# Patient Record
Sex: FEMALE | Race: WHITE | NOT HISPANIC OR LATINO | Employment: STUDENT | URBAN - METROPOLITAN AREA
[De-identification: names, ages, dates, MRNs, and addresses within clinical notes are randomized per-mention and may not be internally consistent; named-entity substitution may affect disease eponyms.]

---
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
## 2022-01-08 ENCOUNTER — HEALTH MAINTENANCE LETTER (OUTPATIENT)
Age: 22
End: 2022-01-08

## 2022-06-01 ENCOUNTER — HOSPITAL ENCOUNTER (OUTPATIENT)
Dept: ULTRASOUND IMAGING | Facility: CLINIC | Age: 22
Discharge: HOME | End: 2022-06-01
Attending: PEDIATRICS | Admitting: PEDIATRICS
Payer: COMMERCIAL

## 2022-06-01 DIAGNOSIS — R10.9 ABDOMINAL PAIN: ICD-10-CM

## 2022-06-01 DIAGNOSIS — R74.8 ELEVATED LIVER ENZYMES: ICD-10-CM

## 2022-06-01 PROCEDURE — 76700 US EXAM ABDOM COMPLETE: CPT

## 2022-11-20 ENCOUNTER — HEALTH MAINTENANCE LETTER (OUTPATIENT)
Age: 22
End: 2022-11-20

## 2023-04-15 ENCOUNTER — HEALTH MAINTENANCE LETTER (OUTPATIENT)
Age: 23
End: 2023-04-15

## 2023-08-25 ENCOUNTER — TRANSFERRED RECORDS (OUTPATIENT)
Dept: HEALTH INFORMATION MANAGEMENT | Facility: CLINIC | Age: 23
End: 2023-08-25

## 2023-10-18 ENCOUNTER — TRANSFERRED RECORDS (OUTPATIENT)
Dept: HEALTH INFORMATION MANAGEMENT | Facility: CLINIC | Age: 23
End: 2023-10-18

## 2023-11-01 ENCOUNTER — TRANSFERRED RECORDS (OUTPATIENT)
Dept: HEALTH INFORMATION MANAGEMENT | Facility: CLINIC | Age: 23
End: 2023-11-01

## 2023-11-01 LAB — EJECTION FRACTION: NORMAL %

## 2023-11-09 ENCOUNTER — TRANSFERRED RECORDS (OUTPATIENT)
Dept: HEALTH INFORMATION MANAGEMENT | Facility: CLINIC | Age: 23
End: 2023-11-09

## 2023-11-28 ENCOUNTER — TRANSFERRED RECORDS (OUTPATIENT)
Dept: HEALTH INFORMATION MANAGEMENT | Facility: CLINIC | Age: 23
End: 2023-11-28

## 2024-01-17 ENCOUNTER — TRANSFERRED RECORDS (OUTPATIENT)
Dept: HEALTH INFORMATION MANAGEMENT | Facility: CLINIC | Age: 24
End: 2024-01-17

## 2024-06-22 ENCOUNTER — HEALTH MAINTENANCE LETTER (OUTPATIENT)
Age: 24
End: 2024-06-22

## 2024-07-11 ENCOUNTER — OFFICE VISIT (OUTPATIENT)
Dept: FAMILY MEDICINE | Facility: CLINIC | Age: 24
End: 2024-07-11
Payer: COMMERCIAL

## 2024-07-11 VITALS
HEART RATE: 82 BPM | HEIGHT: 64 IN | TEMPERATURE: 98.7 F | WEIGHT: 188 LBS | DIASTOLIC BLOOD PRESSURE: 78 MMHG | SYSTOLIC BLOOD PRESSURE: 126 MMHG | BODY MASS INDEX: 32.1 KG/M2 | OXYGEN SATURATION: 98 % | RESPIRATION RATE: 14 BRPM

## 2024-07-11 DIAGNOSIS — F50.9 EATING DISORDER IN REMISSION: ICD-10-CM

## 2024-07-11 DIAGNOSIS — Z01.818 PREOP GENERAL PHYSICAL EXAM: Primary | ICD-10-CM

## 2024-07-11 DIAGNOSIS — N92.1 METRORRHAGIA: ICD-10-CM

## 2024-07-11 DIAGNOSIS — F90.0 ATTENTION DEFICIT HYPERACTIVITY DISORDER (ADHD), PREDOMINANTLY INATTENTIVE TYPE: ICD-10-CM

## 2024-07-11 DIAGNOSIS — Z97.5 IUD (INTRAUTERINE DEVICE) IN PLACE: ICD-10-CM

## 2024-07-11 PROBLEM — Z91.040 LATEX ALLERGY: Status: ACTIVE | Noted: 2024-07-11

## 2024-07-11 PROBLEM — Z91.040 LATEX ALLERGY: Status: RESOLVED | Noted: 2024-07-11 | Resolved: 2024-07-11

## 2024-07-11 PROCEDURE — 99204 OFFICE O/P NEW MOD 45 MIN: CPT | Performed by: FAMILY MEDICINE

## 2024-07-11 RX ORDER — LISDEXAMFETAMINE DIMESYLATE 70 MG/1
70 CAPSULE ORAL EVERY MORNING
COMMUNITY

## 2024-07-11 RX ORDER — METHYLPHENIDATE HYDROCHLORIDE 10 MG/1
10 TABLET ORAL DAILY
Qty: 30 TABLET | Refills: 0 | Status: SHIPPED | OUTPATIENT
Start: 2024-09-09 | End: 2024-10-09

## 2024-07-11 RX ORDER — LISDEXAMFETAMINE DIMESYLATE 70 MG/1
70 CAPSULE ORAL DAILY
Qty: 30 CAPSULE | Refills: 0 | Status: SHIPPED | OUTPATIENT
Start: 2024-08-10 | End: 2024-09-09

## 2024-07-11 RX ORDER — METHYLPHENIDATE HYDROCHLORIDE 10 MG/1
10 TABLET ORAL DAILY
Qty: 30 TABLET | Refills: 0 | Status: SHIPPED | OUTPATIENT
Start: 2024-08-10 | End: 2024-09-09

## 2024-07-11 RX ORDER — PROPRANOLOL HYDROCHLORIDE 40 MG/1
40 TABLET ORAL AT BEDTIME
COMMUNITY
Start: 2024-04-25 | End: 2024-08-13

## 2024-07-11 RX ORDER — METHYLPHENIDATE HYDROCHLORIDE 10 MG/1
10 TABLET ORAL DAILY
Qty: 30 TABLET | Refills: 0 | Status: SHIPPED | OUTPATIENT
Start: 2024-07-11 | End: 2024-08-10

## 2024-07-11 RX ORDER — LISDEXAMFETAMINE DIMESYLATE 70 MG/1
70 CAPSULE ORAL DAILY
Qty: 30 CAPSULE | Refills: 0 | Status: SHIPPED | OUTPATIENT
Start: 2024-07-11 | End: 2024-08-10

## 2024-07-11 RX ORDER — LISDEXAMFETAMINE DIMESYLATE 70 MG/1
70 CAPSULE ORAL DAILY
Qty: 30 CAPSULE | Refills: 0 | Status: SHIPPED | OUTPATIENT
Start: 2024-09-09 | End: 2024-10-09

## 2024-07-11 RX ORDER — HYDROXYZINE PAMOATE 25 MG/1
50 CAPSULE ORAL AT BEDTIME
COMMUNITY
Start: 2024-04-10

## 2024-07-11 NOTE — PATIENT INSTRUCTIONS
Patient Education   Preparing for Your Surgery  Getting started  A nurse will call you to review your health history and instructions. They will give you an arrival time based on your scheduled surgery time. Please be ready to share:  Your doctor's clinic name and phone number  Your medical, surgical, and anesthesia history  A list of allergies and sensitivities  A list of medicines, including herbal treatments and over-the-counter drugs  Whether the patient has a legal guardian (ask how to send us the papers in advance)  Please tell us if you're pregnant--or if there's any chance you might be pregnant. Some surgeries may injure a fetus (unborn baby), so they require a pregnancy test. Surgeries that are safe for a fetus don't always need a test, and you can choose whether to have one.   If you have a child who's having surgery, please ask for a copy of Preparing for Your Child's Surgery.    Preparing for surgery  Within 10 to 30 days of surgery: Have a pre-op exam (sometimes called an H&P, or History and Physical). This can be done at a clinic or pre-operative center.  If you're having a , you may not need this exam. Talk to your care team.  At your pre-op exam, talk to your care team about all medicines you take. If you need to stop any medicines before surgery, ask when to start taking them again.  We do this for your safety. Many medicines can make you bleed too much during surgery. Some change how well surgery (anesthesia) drugs work.  Call your insurance company to let them know you're having surgery. (If you don't have insurance, call [phone].)  Call your clinic if there's any change in your health. This includes signs of a cold or flu (sore throat, runny nose, cough, rash, fever). It also includes a scrape or scratch near the surgery site.  If you have questions on the day of surgery, call your hospital or surgery center.  Eating and drinking guidelines  For your safety: Unless your surgeon  tells you otherwise, follow the guidelines below.  Eat and drink as usual until 8 hours before you arrive for surgery. After that, no food or milk.  Drink clear liquids until 2 hours before you arrive. These are liquids you can see through, like water, Gatorade, and Propel Water. They also include plain black coffee and tea (no cream or milk), candy, and breath mints. You can spit out gum when you arrive.  If you drink alcohol: Stop drinking it the night before surgery.  If your care team tells you to take medicine on the morning of surgery, it's okay to take it with a sip of water.  Preventing infection  Shower or bathe the night before and morning of your surgery. Follow the instructions your clinic gave you. (If no instructions, use regular soap.)  Don't shave or clip hair near your surgery site. We'll remove the hair if needed.  Don't smoke or vape the morning of surgery. You may chew nicotine gum up to 2 hours before surgery. A nicotine patch is okay.  Note: Some surgeries require you to completely quit smoking and nicotine. Check with your surgeon.  Your care team will make every effort to keep you safe from infection. We will:  Clean our hands often with soap and water (or an alcohol-based hand rub).  Clean the skin at your surgery site with a special soap that kills germs.  Give you a special gown to keep you warm. (Cold raises the risk of infection.)  Wear special hair covers, masks, gowns and gloves during surgery.  Give antibiotic medicine, if prescribed. Not all surgeries need antibiotics.  What to bring on the day of surgery  Photo ID and insurance card  Copy of your health care directive, if you have one  Glasses and hearing aids (bring cases)  You can't wear contacts during surgery  Inhaler and eye drops, if you use them (tell us about these when you arrive)  CPAP machine or breathing device, if you use them  A few personal items, if spending the night  If you have . . .  A pacemaker, ICD (cardiac  defibrillator) or other implant: Bring the ID card.  An implanted stimulator: Bring the remote control.  A legal guardian: Bring a copy of the certified (court-stamped) guardianship papers.  Please remove any jewelry, including body piercings. Leave jewelry and other valuables at home.  If you're going home the day of surgery  You must have a responsible adult drive you home. They should stay with you overnight as well.  If you don't have someone to stay with you, and you aren't safe to go home alone, we may keep you overnight. Insurance often won't pay for this.  After surgery  If it's hard to control your pain or you need more pain medicine, please call your surgeon's office.  Questions?   If you have any questions for your care team, list them here: _________________________________________________________________________________________________________________________________________________________________________ ____________________________________ ____________________________________ ____________________________________  For informational purposes only. Not to replace the advice of your health care provider. Copyright   2003, 2019 [facility]. All rights reserved. Clinically reviewed by Rissa Barker MD. [employer] 243100 - REV 12/22.

## 2024-07-11 NOTE — PROGRESS NOTES
{PROVIDER CHARTING PREFERENCE:478030}    Uday Broderick is a 24 year old, presenting for the following health issues:  Consult and Pre-Op Exam        7/11/2024     4:27 PM   Additional Questions   Roomed by Carlos Hernández   Accompanied by self      IV sedation to place and IUD at [facility] in [place]     Dr. Thompson    1. No - Have you ever had a heart attack or stroke?  2. No - Have you ever had surgery on your heart or blood vessels, such as a stent, coronary (heart) bypass, or surgery on an artery in the head, neck, heart, or legs?  3. No - Do you have chest pain when you are physically active?  4. No - Do you have a history of heart failure?  5. No - Do you currently have a cold, bronchitis, or symptoms of other respiratory (head and chest) infections?  6. No - Do you have a cough, shortness of breath, or wheezing?  7. Yes - Do you or anyone in your family have a history of blood clots? family  8. No - Do you or anyone in your family have a serious bleeding problem, such as long-lasting bleeding after surgeries or cuts?  9. No - Have you ever had anemia or been told to take iron pills?  10. No - Have you had any abnormal blood loss such as black, tarry or bloody stools, or abnormal vaginal bleeding?  11. No - Have you ever had a blood transfusion?  12. Yes - Are you willing to have a blood transfusion if it is medically needed before, during, or after your surgery?  13. No - Have you or anyone in your family ever had problems with anesthesia (sedation for surgery)?  14. No - Do you have sleep apnea, excessive snoring, or daytime drowsiness?   15. No - Do you have any artifical heart valves or other implanted medical devices, such as a pacemaker, defibrillator, or continuous glucose monitor?  16. No - Do you have any artifical joints?  17. No - Are you allergic to latex?  18. No - Is there any chance that you may be pregnant?         Review of Systems  Constitutional, neuro, ENT, endocrine, pulmonary,  "cardiac, gastrointestinal, genitourinary, musculoskeletal, integument and psychiatric systems are negative, except as otherwise noted.      Objective    /78 (BP Location: Right arm, Patient Position: Chair, Cuff Size: Adult Regular)   Pulse 82   Temp 98.7  F (37.1  C) (Oral)   Resp 14   Ht 1.632 m (5' 4.25\")   Wt 85.3 kg (188 lb)   LMP 06/18/2024   SpO2 98%   Breastfeeding No   BMI 32.02 kg/m    Body mass index is 32.02 kg/m .  Physical Exam   GENERAL: alert and no distress  EYES: Eyes grossly normal to inspection, PERRL and conjunctivae and sclerae normal  HENT: ear canals and TM's normal, nose and mouth without ulcers or lesions  NECK: no adenopathy, no asymmetry, masses, or scars  RESP: lungs clear to auscultation - no rales, rhonchi or wheezes  CV: regular rate and rhythm, normal S1 S2, no S3 or S4, no murmur, click or rub, no peripheral edema  MS: no gross musculoskeletal defects noted, no edema  NEURO: Normal strength and tone, mentation intact and speech normal  PSYCH: mentation appears normal, affect normal/bright          Signed Electronically by: Katelynn Angeles MD  {Email feedback regarding this note to [email or web address]   :[id number]}  "

## 2024-07-11 NOTE — PROGRESS NOTES
Preoperative Evaluation  [facility]  [address] [facility] [address]  Phone: [phone]  Primary Provider: Haven Lazar MD  Pre-op Performing Provider: Katelynn Angeles MD  Jul 11, 2024       Procedure: IUD replacement  Date: 7/29/2024  Anesthesia: sounds like MAC proposed  Provider: Dr. Thompson       Assessment & Plan     The proposed surgical procedure is considered LOW risk.    Preop general physical exam    Metrorrhagia    IUD (intrauterine device) in place    Attention deficit hyperactivity disorder (ADHD), predominantly inattentive type - stable, refills  - lisdexamfetamine (VYVANSE) 70 MG capsule; Take 1 capsule (70 mg) by mouth daily for 30 days  - lisdexamfetamine (VYVANSE) 70 MG capsule; Take 1 capsule (70 mg) by mouth daily for 30 days  - lisdexamfetamine (VYVANSE) 70 MG capsule; Take 1 capsule (70 mg) by mouth daily for 30 days  - methylphenidate (RITALIN) 10 MG tablet; Take 1 tablet (10 mg) by mouth daily for 30 days  - methylphenidate (RITALIN) 10 MG tablet; Take 1 tablet (10 mg) by mouth daily for 30 days  - methylphenidate (RITALIN) 10 MG tablet; Take 1 tablet (10 mg) by mouth daily for 30 days    Eating disorder in remission       - No identified additional risk factors other than previously addressed    Antiplatelet or Anticoagulation Medication Instructions   - Patient is on no antiplatelet or anticoagulation medications.    Additional Medication Instructions  Meggan will hold all of her prescription medications AM of procedure    Recommendation  Approval given to proceed with proposed procedure, without further diagnostic evaluation.      Subjective:   HPI related to upcoming procedure: IUD expiring, needs replacement    1. No - Have you ever had a heart attack or stroke?  2. No - Have you ever had surgery on your heart or blood vessels, such as a stent, coronary (heart) bypass, or surgery on an artery in the head, neck, heart, or legs?  3. No  - Do you have chest pain when you are physically active?  4. No - Do you have a history of heart failure?  5. No - Do you currently have a cold, bronchitis, or symptoms of other respiratory (head and chest) infections?  6. No - Do you have a cough, shortness of breath, or wheezing?  7. Yes - Do you or anyone in your family have a history of blood clots? Family - second degree relative, no FH of thrombophilia  8. No - Do you or anyone in your family have a serious bleeding problem, such as long-lasting bleeding after surgeries or cuts?  9. No - Have you ever had anemia or been told to take iron pills?  10. No - Have you had any abnormal blood loss such as black, tarry or bloody stools, or abnormal vaginal bleeding?  11. No - Have you ever had a blood transfusion?  12. Yes - Are you willing to have a blood transfusion if it is medically needed before, during, or after your surgery?  13. No - Have you or anyone in your family ever had problems with anesthesia (sedation for surgery)?  14. No - Do you have sleep apnea, excessive snoring, or daytime drowsiness?   15. No - Do you have any artifical heart valves or other implanted medical devices, such as a pacemaker, defibrillator, or continuous glucose monitor?  16. No - Do you have any artifical joints?  17. No - Are you allergic to latex?  18. No - Is there any chance that you may be pregnant?        Preoperative Review of    reviewed - no record of controlled substances prescribed.      Status of Chronic Conditions:  See problem list for active medical problems.  Problems all longstanding and stable, except as noted/documented.  See ROS for pertinent symptoms related to these conditions.    Patient Active Problem List    Diagnosis Date Noted    Attention deficit hyperactivity disorder (ADHD), predominantly inattentive type 07/11/2024     Priority: Medium    Eating disorder in remission 07/11/2024     Priority: Medium      Past Medical History:   Diagnosis Date     "ADHD (attention deficit hyperactivity disorder)      No past surgical history on file.  Current Outpatient Medications   Medication Sig Dispense Refill    hydrOXYzine karl (VISTARIL) 25 MG capsule 50 mg at bedtime      levonorgestrel (MIRENA) 20 MCG/24HR IUD 1 each by Intrauterine route once      lisdexamfetamine (VYVANSE) 70 MG capsule 70 mg every morning      lisdexamfetamine (VYVANSE) 70 MG capsule Take 1 capsule (70 mg) by mouth daily for 30 days 30 capsule 0    [START ON 8/10/2024] lisdexamfetamine (VYVANSE) 70 MG capsule Take 1 capsule (70 mg) by mouth daily for 30 days 30 capsule 0    [START ON 9/9/2024] lisdexamfetamine (VYVANSE) 70 MG capsule Take 1 capsule (70 mg) by mouth daily for 30 days 30 capsule 0    methylphenidate (RITALIN) 10 MG tablet Take 1 tablet (10 mg) by mouth daily for 30 days 30 tablet 0    [START ON 8/10/2024] methylphenidate (RITALIN) 10 MG tablet Take 1 tablet (10 mg) by mouth daily for 30 days 30 tablet 0    [START ON 9/9/2024] methylphenidate (RITALIN) 10 MG tablet Take 1 tablet (10 mg) by mouth daily for 30 days 30 tablet 0    methylphenidate (RITALIN) 10 MG tablet as needed  0    propranolol (INDERAL) 40 MG tablet Take 40 mg by mouth at bedtime         No Known Allergies     Social History     Tobacco Use    Smoking status: Never    Smokeless tobacco: Never   Substance Use Topics    Alcohol use: No     No family history on file.  History   Drug Use No             Review of Systems  Constitutional, neuro, ENT, endocrine, pulmonary, cardiac, gastrointestinal, genitourinary, musculoskeletal, integument and psychiatric systems are negative, except as otherwise noted.    Objective    /78 (BP Location: Right arm, Patient Position: Chair, Cuff Size: Adult Regular)   Pulse 82   Temp 98.7  F (37.1  C) (Oral)   Resp 14   Ht 1.632 m (5' 4.25\")   Wt 85.3 kg (188 lb)   LMP 06/18/2024   SpO2 98%   Breastfeeding No   BMI 32.02 kg/m     Estimated body mass index is 32.02 kg/m  as " "calculated from the following:    Height as of this encounter: 1.632 m (5' 4.25\").    Weight as of this encounter: 85.3 kg (188 lb).  Physical Exam  Objective  /78 (BP Location: Right arm, Patient Position: Chair, Cuff Size: Adult Regular)   Pulse 82   Temp 98.7  F (37.1  C) (Oral)   Resp 14   Ht 1.632 m (5' 4.25\")   Wt 85.3 kg (188 lb)   LMP 06/18/2024   SpO2 98%   Breastfeeding No   BMI 32.02 kg/m    Body mass index is 32.02 kg/m .  Physical Exam   GENERAL: alert and no distress  EYES: Eyes grossly normal to inspection, PERRL and conjunctivae and sclerae normal  HENT: ear canals and TM's normal, nose and mouth without ulcers or lesions  NECK: no adenopathy, no asymmetry, masses, or scars  RESP: lungs clear to auscultation - no rales, rhonchi or wheezes  CV: regular rate and rhythm, normal S1 S2, no S3 or S4, no murmur, click or rub, no peripheral edema  MS: no gross musculoskeletal defects noted, no edema  NEURO: Normal strength and tone, mentation intact and speech normal  PSYCH: mentation appears normal, affect normal/bright    Diagnostics  No labs were ordered during this visit.   No EKG required for low risk surgery (cataract, skin procedure, breast biopsy, etc).    Revised Cardiac Risk Index (RCRI)  The patient has the following serious cardiovascular risks for perioperative complications:   - No serious cardiac risks = 0 points     RCRI Interpretation: 0 points: Class I (very low risk - 0.4% complication rate)         Signed Electronically by: Katelynn Angeles MD  Copy of this evaluation report is provided to requesting physician.         "

## 2024-07-18 ENCOUNTER — TRANSFERRED RECORDS (OUTPATIENT)
Dept: MULTI SPECIALTY CLINIC | Facility: CLINIC | Age: 24
End: 2024-07-18

## 2024-07-18 ENCOUNTER — MEDICAL CORRESPONDENCE (OUTPATIENT)
Dept: HEALTH INFORMATION MANAGEMENT | Facility: CLINIC | Age: 24
End: 2024-07-18
Payer: COMMERCIAL

## 2024-07-18 LAB — PAP SMEAR - HIM PATIENT REPORTED: NORMAL

## 2024-08-09 NOTE — TELEPHONE ENCOUNTER
RECORDS RECEIVED FROM:    DATE RECEIVED:    NOTES STATUS DETAILS   OFFICE NOTE from referring provider  N/A    OFFICE NOTE from other cardiologists  In process    RECORDS from hospital/ED N/A    MEDICATION LIST Internal    GENERAL CARDIO RECORDS   (ALL APPOINTMENT TYPES)     LABS (CBC,BMP,CMP, TSH) Internal    EKG (STRIPS & REPORTS) In process    MONITORS (STRIPS & REPORTS) N/A    ECHOS (IMAGES AND REPORTS) N/A    STRESS TESTS (IMAGES AND REPORTS) N/A    cMRI (IMAGES AND REPORTS) N/A    Cardiac cath (IMAGES AND REPORTS) N/A    CT/CTA (IMAGES AND REPORTS) N/A      Action 8/9/24 [facility]  Fax #[phone]   Action Taken Requested office notes and testing reports from 2022 - current    8-14 Records in Pt Chart from [place]

## 2024-08-12 NOTE — PROGRESS NOTES
"Chief complaint: Hypertension    HPI:   Ms Meggan Kevin is a 24 year old female with history of ADHD who presents to cardiology clinic for evaluation of palpitations and syncope.     Ms Kevin has been previously seen by [facility] in [place]. The patient was living in [facility]. She reports a history of eating disorder for which she underwent treatment in [place]. During this treatment period (late summer and fall 2023), she was noted to have some \"abnormalities with her heart\" but she is unsure of the details. This prompted a referral to cardiology. While awaiting cardiology evaluation, she had an episodes of palpitations and syncope at rest. This occurred after a stressful week of taking the LSAT, she was resting in a seating position, had onset of palpitations and lightheadedness, and lost consciousness of a couple of seconds. Cardiology ordered a Holter monitor and then prescribed her Propranolol after seeing the results. On further interrogation, the patient had been experiencing intermittent palpitations and lightheadedness in the months leading up to her syncopal event. These have resolved since starting propranolol last fall.     She otherwise denies cardiac history. She is currently taking Fluoxetine, Vyvanze, and Hydroxyzine regularly. She takes Methylphenidate PRN which is typically Monday-Friday during the school year.     She has no family history of early onset heart disease. There are many relatives with MI but these are later in life. No family history of early CAD, no family history of SCD or unexplained death. Parents have elevated heart rates but no formal arrhythmia diagnosis or history of ablation. Siblings are without heart disease.     She denies any chest pain, dyspnea at rest or with exertion, PND, orthopnea, peripheral edema, palpitations, lightheadedness or syncope.       PAST MEDICAL HISTORY:  Past Medical History:   Diagnosis Date    ADHD (attention deficit " hyperactivity disorder)        CURRENT MEDICATIONS:  Current Outpatient Medications   Medication Sig Dispense Refill    hydrOXYzine karl (VISTARIL) 25 MG capsule 50 mg at bedtime      levonorgestrel (MIRENA) 20 MCG/24HR IUD 1 each by Intrauterine route once      lisdexamfetamine (VYVANSE) 70 MG capsule 70 mg every morning      lisdexamfetamine (VYVANSE) 70 MG capsule Take 1 capsule (70 mg) by mouth daily for 30 days 30 capsule 0    [START ON 9/9/2024] lisdexamfetamine (VYVANSE) 70 MG capsule Take 1 capsule (70 mg) by mouth daily for 30 days 30 capsule 0    methylphenidate (RITALIN) 10 MG tablet Take 1 tablet (10 mg) by mouth daily for 30 days 30 tablet 0    [START ON 9/9/2024] methylphenidate (RITALIN) 10 MG tablet Take 1 tablet (10 mg) by mouth daily for 30 days 30 tablet 0    methylphenidate (RITALIN) 10 MG tablet as needed  0    propranolol (INDERAL) 40 MG tablet Take 40 mg by mouth at bedtime         ALLERGIES:   No Known Allergies    FAMILY HISTORY:  No family history on file.  No family history of premature CAD or sudden death.    SOCIAL HISTORY:  Social History     Tobacco Use    Smoking status: Never    Smokeless tobacco: Never   Substance Use Topics    Alcohol use: No    Drug use: No       ROS:   A comprehensive 14 point review of systems is negative other than as mentioned in HPI.    Exam:  /85 (BP Location: Right arm, Patient Position: Chair, Cuff Size: Adult Regular)   Pulse 74   Wt 85.7 kg (189 lb)   LMP 06/18/2024   SpO2 96%   BMI 32.19 kg/m    GENERAL APPEARANCE: healthy, alert and no distress  EYES: no icterus, no xanthelasmas  ENT: normal palate, mucosa moist, no central cyanosis  NECK: JVP not elevated  RESPIRATORY: lungs clear to auscultation - no rales, rhonchi or wheezes, no use of accessory muscles, no retractions, respirations are unlabored, normal respiratory rate  CARDIOVASCULAR: regular rhythm, normal S1 with physiologic split S2, no S3 or S4 and no murmur, click or rub.  GI:  soft, non tender, bowel sounds normal,no abdominal bruits  EXTREMITIES: no edema, no bruits  NEURO: alert and oriented to person/place/time, normal speech, gait and affect  SKIN: no ecchymoses, no rashes.  PSYCH: cooperative, affect appropriate.     Labs:  Reviewed.   Lipids 12/2020: Chol 178, , HDL 36,     Testing/Procedures:  None    I personally visualized and interpreted:  None    Outside results of note:  Outside records from [facility] will be obtained, and relevant results/notes will be incorporated into future visits.      Assessment and Plan:   Ms Meggan Kevin is a 24 year old female with history of ADHD who presents to cardiology clinic for evaluation of hypertension.     Palpitations  Syncope   Appears arrhythmogenic by history and based on prescription of propranolol after Holter monitoring. Suspect this is supraventricular however we are still tracking down her formal results. As she is currently asymptomatic, we will defer additional testing at this time in favor of obtaining her outside records. We will continue her Propranolol in the interim.     Hyperlipidemia  Lipids from 2020 demonstrate elevated LDL to 120. In setting of eating disorder, subsequent treatment, fluctuations in weight, and changes in exercise habits; will repeat lipids at next visit and reassess.     Follow up in 1 year with fasting labs.     This patient was seen and discussed with Dr Aakash Alvarez, attending cardiologist, who agrees with the assessment and plan unless otherwise indicated.    Eliezer Cuevas MD [facility], PGY-6  Fellow, Cardiovascular Disease

## 2024-08-13 ENCOUNTER — OFFICE VISIT (OUTPATIENT)
Dept: CARDIOLOGY | Facility: CLINIC | Age: 24
End: 2024-08-13
Attending: INTERNAL MEDICINE
Payer: COMMERCIAL

## 2024-08-13 ENCOUNTER — PRE VISIT (OUTPATIENT)
Dept: CARDIOLOGY | Facility: CLINIC | Age: 24
End: 2024-08-13
Payer: COMMERCIAL

## 2024-08-13 VITALS
OXYGEN SATURATION: 96 % | DIASTOLIC BLOOD PRESSURE: 85 MMHG | BODY MASS INDEX: 32.19 KG/M2 | HEART RATE: 74 BPM | SYSTOLIC BLOOD PRESSURE: 132 MMHG | WEIGHT: 189 LBS

## 2024-08-13 DIAGNOSIS — R55 VASOVAGAL SYNCOPE: ICD-10-CM

## 2024-08-13 DIAGNOSIS — R00.2 PALPITATIONS: Primary | ICD-10-CM

## 2024-08-13 DIAGNOSIS — I47.10 SVT (SUPRAVENTRICULAR TACHYCARDIA) (H): ICD-10-CM

## 2024-08-13 PROCEDURE — 99203 OFFICE O/P NEW LOW 30 MIN: CPT | Mod: GC | Performed by: INTERNAL MEDICINE

## 2024-08-13 PROCEDURE — 99213 OFFICE O/P EST LOW 20 MIN: CPT | Performed by: INTERNAL MEDICINE

## 2024-08-13 RX ORDER — PROPRANOLOL HYDROCHLORIDE 40 MG/1
40 TABLET ORAL AT BEDTIME
Qty: 90 TABLET | Refills: 3 | Status: SHIPPED | OUTPATIENT
Start: 2024-08-13

## 2024-08-13 ASSESSMENT — PAIN SCALES - GENERAL: PAINLEVEL: NO PAIN (0)

## 2024-08-13 NOTE — PATIENT INSTRUCTIONS
"                                                                          Take your medicines every day, as directed     Changes made today:  Continue taking propranolol 40 mg once daily.        Cardiology Care Coordinators:       Pam Gonzales RN BSN CHFN  Cardiology Care Coordinator - Heart Failure/ [facility]  [facility]   Questions and [phone]   First press #1 for the [employer] and \"To send a message to your care team\"   To Contact us     During Business Hours:  [phone]  press option # 1 to schedule or leave a message for your care team     If you are needing refills please contact your pharmacy.     For urgent after hour care please call the [facility] at [phone] or the [facility] at [phone] and ask to speak to the cardiologist on call.            HOW TO CHECK YOUR BLOOD PRESSURE AT HOME:     Avoid eating, smoking, and exercising for at least 30 minutes before taking a reading.     Be sure you have taken your BP medication at least 2-3 hours before you check it.      Sit quietly for 10 minutes before a reading.      Sit in a chair with your feet flat on the floor. Rest your  arm on a table so that the arm cuff is at the same level as your heart.     Remain still during the reading.  Record your blood pressure and pulse in a log and bring to your next appointment.       Use [email or web address] allows you to communicate directly with your heart team through secure messaging.  [employer] can be accessed any time on your phone, computer, or tablet.  If you need assistance, we'd be happy to help!             Keep your Heart Appointments:     Follow up with General Cardiology in 1 year. No prior testing needed.        "

## 2024-08-13 NOTE — LETTER
"8/13/2024      RE: Meggan Kevin  [address]  [facility] [place] [facility] [address]       Dear Colleague,    Thank you for the opportunity to participate in the care of your patient, Meggan Kevin, at the [facility] [place] [facility]. Please see a copy of my visit note below.    Chief complaint: Hypertension    HPI:   Ms Meggan Kevin is a 24 year old female with history of ADHD who presents to cardiology clinic for evaluation of palpitations and syncope.     Ms Kevin has been previously seen by [facility] in [place]. The patient was living in [facility]. She reports a history of eating disorder for which she underwent treatment in [place]. During this treatment period (late summer and fall 2023), she was noted to have some \"abnormalities with her heart\" but she is unsure of the details. This prompted a referral to cardiology. While awaiting cardiology evaluation, she had an episodes of palpitations and syncope at rest. This occurred after a stressful week of taking the LSAT, she was resting in a seating position, had onset of palpitations and lightheadedness, and lost consciousness of a couple of seconds. Cardiology ordered a Holter monitor and then prescribed her Propranolol after seeing the results. On further interrogation, the patient had been experiencing intermittent palpitations and lightheadedness in the months leading up to her syncopal event. These have resolved since starting propranolol last fall.     She otherwise denies cardiac history. She is currently taking Fluoxetine, Vyvanze, and Hydroxyzine regularly. She takes Methylphenidate PRN which is typically Monday-Friday during the school year.     She has no family history of early onset heart disease. There are many relatives with MI but these are later in life. No family history of early CAD, no family history of SCD or unexplained death. Parents have elevated " heart rates but no formal arrhythmia diagnosis or history of ablation. Siblings are without heart disease.     She denies any chest pain, dyspnea at rest or with exertion, PND, orthopnea, peripheral edema, palpitations, lightheadedness or syncope.       PAST MEDICAL HISTORY:  Past Medical History:   Diagnosis Date     ADHD (attention deficit hyperactivity disorder)        CURRENT MEDICATIONS:  Current Outpatient Medications   Medication Sig Dispense Refill     hydrOXYzine karl (VISTARIL) 25 MG capsule 50 mg at bedtime       levonorgestrel (MIRENA) 20 MCG/24HR IUD 1 each by Intrauterine route once       lisdexamfetamine (VYVANSE) 70 MG capsule 70 mg every morning       lisdexamfetamine (VYVANSE) 70 MG capsule Take 1 capsule (70 mg) by mouth daily for 30 days 30 capsule 0     [START ON 9/9/2024] lisdexamfetamine (VYVANSE) 70 MG capsule Take 1 capsule (70 mg) by mouth daily for 30 days 30 capsule 0     methylphenidate (RITALIN) 10 MG tablet Take 1 tablet (10 mg) by mouth daily for 30 days 30 tablet 0     [START ON 9/9/2024] methylphenidate (RITALIN) 10 MG tablet Take 1 tablet (10 mg) by mouth daily for 30 days 30 tablet 0     methylphenidate (RITALIN) 10 MG tablet as needed  0     propranolol (INDERAL) 40 MG tablet Take 40 mg by mouth at bedtime         ALLERGIES:   No Known Allergies    FAMILY HISTORY:  No family history on file.  No family history of premature CAD or sudden death.    SOCIAL HISTORY:  Social History     Tobacco Use     Smoking status: Never     Smokeless tobacco: Never   Substance Use Topics     Alcohol use: No     Drug use: No       ROS:   A comprehensive 14 point review of systems is negative other than as mentioned in HPI.    Exam:  /85 (BP Location: Right arm, Patient Position: Chair, Cuff Size: Adult Regular)   Pulse 74   Wt 85.7 kg (189 lb)   LMP 06/18/2024   SpO2 96%   BMI 32.19 kg/m    GENERAL APPEARANCE: healthy, alert and no distress  EYES: no icterus, no xanthelasmas  ENT: normal  palate, mucosa moist, no central cyanosis  NECK: JVP not elevated  RESPIRATORY: lungs clear to auscultation - no rales, rhonchi or wheezes, no use of accessory muscles, no retractions, respirations are unlabored, normal respiratory rate  CARDIOVASCULAR: regular rhythm, normal S1 with physiologic split S2, no S3 or S4 and no murmur, click or rub.  GI: soft, non tender, bowel sounds normal,no abdominal bruits  EXTREMITIES: no edema, no bruits  NEURO: alert and oriented to person/place/time, normal speech, gait and affect  SKIN: no ecchymoses, no rashes.  PSYCH: cooperative, affect appropriate.     Labs:  Reviewed.   Lipids 12/2020: Chol 178, , HDL 36,     Testing/Procedures:  None    I personally visualized and interpreted:  None    Outside results of note:  Outside records from [facility] will be obtained, and relevant results/notes will be incorporated into future visits.      Assessment and Plan:   Ms Meggan Kevin is a 24 year old female with history of ADHD who presents to cardiology clinic for evaluation of hypertension.     Palpitations  Syncope   Appears arrhythmogenic by history and based on prescription of propranolol after Holter monitoring. Suspect this is supraventricular however we are still tracking down her formal results. As she is currently asymptomatic, we will defer additional testing at this time in favor of obtaining her outside records. We will continue her Propranolol in the interim.     Hyperlipidemia  Lipids from 2020 demonstrate elevated LDL to 120. In setting of eating disorder, subsequent treatment, fluctuations in weight, and changes in exercise habits; will repeat lipids at next visit and reassess.     Follow up in 1 year with fasting labs.     This patient was seen and discussed with Dr Aakash Alvarez, attending cardiologist, who agrees with the assessment and plan unless otherwise indicated.    Eliezer Cuevas MD [facility], PGY-6  Fellow, Cardiovascular  Disease              Attestation signed by Aakash Alvarez MD at 8/13/2024 11:06 AM (Updated):  Physician Attestation  I, Aakash Alvarez MD, saw this patient and agree with the findings and plan of care as documented in the note.      Items personally reviewed/procedural attestation: vitals, labs, imaging and agree with the interpretation documented in the note, and EKG and agree with the interpretation documented in the note.    A/P in brief:  Syncope: with typical vagal prodrome, no further workup is indicated at this time.  Palpitations, likely SVT: based on history and prior management, SVT appears likely. We are in the process of obtaining records (including ambulatory ECG which was reportedly diagnostic and prompted initiation of propranolol.) Symptoms have resolved without recurrence on propranolol, which has been well-tolerated.   -continue propranolol  -obtain records (including ambulatory ECG) from [place] -- if results show unexpected/actionable findings beyond what is discussed above (SVT and/or PACs/PVCs), we will reach out to Meggan and coordinate additional workup/follow up as indicated  -otherwise, plan for follow up in 1 year (potentially follow up PRN after that if she remains symptom free)    Aakash Alvarez MD      Please do not hesitate to contact me if you have any questions/concerns.     Sincerely,     Aakash Alvarez MD

## 2024-08-13 NOTE — NURSING NOTE
Chief Complaint   Patient presents with    New Patient     24 year old female with presenting for evaluation for HTN         Vitals were taken, medications reconciled.    Paige Thurner, Facilitator   10:26 AM

## 2024-09-09 ENCOUNTER — DOCUMENTATION ONLY (OUTPATIENT)
Dept: CARDIOLOGY | Facility: CLINIC | Age: 24
End: 2024-09-09
Payer: COMMERCIAL

## 2024-09-09 DIAGNOSIS — R00.2 PALPITATIONS: ICD-10-CM

## 2024-09-09 DIAGNOSIS — I47.10 SVT (SUPRAVENTRICULAR TACHYCARDIA) (H): Primary | ICD-10-CM

## 2024-09-09 NOTE — PROGRESS NOTES
I reviewed the scanned Holter monitor results from [facility] dated 11/6/23-11/9/23.    These appear to show sinus rhythm throughout with a single symptomatic run of possible SVT with VR ~187 bpm.       Based on these findings, the presumptive diagnosis of SVT in my note dated 8/13/24 appears to be accurate. Propranolol had been effective in suppressing palpitations and should be continued.     Plan for follow up in 1 year (potentially follow up PRN after that if she remains symptom free.)    Aakash Alvarez MD  Cardiology

## 2024-09-24 ENCOUNTER — OFFICE VISIT (OUTPATIENT)
Dept: FAMILY MEDICINE | Facility: CLINIC | Age: 24
End: 2024-09-24
Payer: COMMERCIAL

## 2024-09-24 VITALS
HEIGHT: 65 IN | TEMPERATURE: 98.1 F | DIASTOLIC BLOOD PRESSURE: 82 MMHG | SYSTOLIC BLOOD PRESSURE: 115 MMHG | WEIGHT: 189.6 LBS | OXYGEN SATURATION: 98 % | BODY MASS INDEX: 31.59 KG/M2 | HEART RATE: 97 BPM

## 2024-09-24 DIAGNOSIS — R07.0 THROAT PAIN: Primary | ICD-10-CM

## 2024-09-24 DIAGNOSIS — R11.2 NAUSEA AND VOMITING, UNSPECIFIED VOMITING TYPE: ICD-10-CM

## 2024-09-24 DIAGNOSIS — R50.9 FEVER, UNSPECIFIED FEVER CAUSE: ICD-10-CM

## 2024-09-24 LAB
DEPRECATED S PYO AG THROAT QL EIA: NEGATIVE
FLUAV AG SPEC QL IA: NEGATIVE
FLUBV AG SPEC QL IA: NEGATIVE
GROUP A STREP BY PCR: NOT DETECTED

## 2024-09-24 PROCEDURE — 87651 STREP A DNA AMP PROBE: CPT | Mod: ORL | Performed by: NURSE PRACTITIONER

## 2024-09-24 PROCEDURE — 87635 SARS-COV-2 COVID-19 AMP PRB: CPT | Mod: ORL | Performed by: NURSE PRACTITIONER

## 2024-09-24 NOTE — PATIENT INSTRUCTIONS
Sore throat with fever, chills, malaise and vomiting  Rapid strep test: NEGATIVE    Influenza A/B rapid (Negative)    COVID-19 test: will notify of results, likely tomorrow    - Encourage hydration, Pedialyte or Gatorade if not eating well. Take sips all day.   - Tylenol and ibuprofen for fever management  - Rest  -Avoid milk products for now    Present to the emergency room:  - If vomiting persists and cannot keep food or water down  - Increased shortness of breath or difficulty breathing

## 2024-09-24 NOTE — LETTER
2024    Meggan Kevin   2000        To Whom it May Concern;    Please excuse Meggan Kevin from work/school for a healthcare visit on Sep 24, 2024. Patient is ill and is advised not to return to work or school until fever free for 24 hours without medication and showing signs of improvement. Results of diagnostic testing are pending.     Sincerely,        SINDI Cash CNP

## 2024-09-24 NOTE — PROGRESS NOTES
"       HPI       Meggan Kevin is a 24 year old  who presents for   Chief Complaint   Patient presents with    Sick     Congestion, cough, sore throat, vomiting, chills, fever, started on Sunday       Patient is a 24 year old female who presents with 3 days of a productive cough with yellow/ green sputum, chills, rhinorrhea and fever (highest 100.1F). Emesis started today with one episode after water intake and one emesis of bile in the office. Patient was exposed to boyfriend who recently had similar symptoms. He didn't seek treatment and did not have a diagnosis. Denied recent travel. Patient has never had COVID-19. No influenza or COVID-19 vaccine this year.     Social History  In [employer] school at the [facility] [facility], Medication and Allergy Lists were reviewed and updated if needed..    Patient is a new patient to this clinic and so  I reviewed/updated the Past Medical History, the Family History and the Social History .  Past Medical History:   Diagnosis Date    ADHD (attention deficit hyperactivity disorder)     Anorexia     Anxiety     Depression        Past Surgical History:   Procedure Laterality Date    ANKLE SURGERY         History reviewed. No pertinent family history.    Social History     Tobacco Use    Smoking status: Never    Smokeless tobacco: Never   Substance Use Topics    Alcohol use: No              Review of Systems:   Review of Systems    GENERAL: See HPI. Achy, malaise. No rash. Fever  ENT: See HPI. Congestion nose and sinuses.   SKIN: No rash  RESP: Denied shortness of breath with activity. Mild SOB with coughing. Some chest pain with coughing. Infrequent wheezing.   GI: Denied abdominal pain.endoreses N/V.            Physical Exam:     Vitals:    09/24/24 1327   BP: 115/82   BP Location: Left arm   Patient Position: Sitting   Cuff Size: Adult Regular   Pulse: 97   Temp: 98.1  F (36.7  C)   TempSrc: Oral   SpO2: 98%   Weight: 86 kg (189 lb 9.6 oz)   Height: 1.651 m (5' 5\")     Body mass " "index is 31.55 kg/m .  Blood pressure 115/82, pulse 97, temperature 98.1  F (36.7  C), temperature source Oral, height 1.651 m (5' 5\"), weight 86 kg (189 lb 9.6 oz), SpO2 98%, not currently breastfeeding.     Physical Exam  General: Alert, ill-appearing  EENT: Eyes without redness, discharge. Ears: Left ear tympanic membrane pearly grey. Nares intact and patent. Tonsils +1 bilaterally without erythema or exudate. Oral mucosa moist. No lesions in mouth.    LYMPH: not enlarged. Nontender.   Resp: Lung sounds clear to bilateral upper lobes, mildly coarse to bilateral lower lobes. No wheezing or crackles noted.   Cardiac: S1, S2 without murmurs, clicks or gallops  ABD: vomited several times during the exam. Deferred Abdominal exam today.   SKIN: no rash, pink; face flushed      Results:    Results from this visit  Results for orders placed or performed in visit on 09/24/24   Streptococcus A Rapid Screen w/Reflex to PCR - Clinic Collect     Status: Normal    Specimen: Throat; Swab   Result Value Ref Range    Group A Strep antigen Negative Negative   Influenza A/B antigen     Status: Normal    Specimen: Nasopharyngeal; Swab   Result Value Ref Range    Influenza A antigen Negative Negative    Influenza B antigen Negative Negative    Narrative    Test results must be correlated with clinical data. If necessary, results should be confirmed by a molecular assay or viral culture.       Assessment and Plan        Diagnoses and all orders for this visit:    Throat pain  Acute illness.  Likely viral etiology; await COVID. Influenza and RST negative. Comfort measures. Seek care if unable to maintain hydration or worsening.   -     Streptococcus A Rapid Screen w/Reflex to PCR - Clinic Collect  -     Group A Streptococcus PCR Throat Swab  -     Influenza A/B antigen  -     Symptomatic COVID-19 Virus (Coronavirus) by PCR; Future  -     Symptomatic COVID-19 Virus (Coronavirus) by PCR Nose    Fever, unspecified fever cause  See " above  -     Influenza A/B antigen  -     Symptomatic COVID-19 Virus (Coronavirus) by PCR Nose    Nausea and vomiting, unspecified vomiting type  Continue hydration, recommended Pedialyte and Gatorade. Present to the ED if unable to keep food or water down related to N/V or worsening shortness breath.   -     Symptomatic COVID-19 Virus (Coronavirus) by PCR Nose           There are no discontinued medications.      Options for treatment and follow-up care were reviewed with the patient. Meggan Kevin  engaged in the decision making process and verbalized understanding of the options discussed and agreed with the final plan.  I was present with the NPP student Liliana Abdi RN, who participated in the service and in the documentation of the services provided. I have verified the history and personally performed the physical exam and medical decision making, as documented by the student and edited by me    SINDI Cash CNP  09/24/24  5:04 PM      SINDI Cash CNP

## 2024-09-24 NOTE — NURSING NOTE
[facility]    Preferred Name: Meggan     Social Determinants of Health   SDoH screening reviewed today: Yes     Services Provided? No    24 year old  Chief Complaint   Patient presents with    Sick     Congestion, cough, sore throat, vomiting, chills, fever, started on Sunday       Blood pressure 115/82, pulse 97, temperature 98.1  F (36.7  C), temperature source Oral, height 1.651 m (5' 5\"), weight 86 kg (189 lb 9.6 oz), SpO2 98%, not currently breastfeeding. Body mass index is 31.55 kg/m .  BP completed using cuff size:        Patient Active Problem List   Diagnosis    Attention deficit hyperactivity disorder (ADHD), predominantly inattentive type    Eating disorder in remission       Wt Readings from Last 2 Encounters:   09/24/24 86 kg (189 lb 9.6 oz)   08/13/24 85.7 kg (189 lb)     BP Readings from Last 3 Encounters:   09/24/24 115/82   08/13/24 132/85   07/11/24 126/78       No Known Allergies    Current Outpatient Medications   Medication Sig Dispense Refill    hydrOXYzine karl (VISTARIL) 25 MG capsule 50 mg at bedtime      levonorgestrel (MIRENA) 20 MCG/24HR IUD 1 each by Intrauterine route once      lisdexamfetamine (VYVANSE) 70 MG capsule 70 mg every morning      lisdexamfetamine (VYVANSE) 70 MG capsule Take 1 capsule (70 mg) by mouth daily for 30 days 30 capsule 0    methylphenidate (RITALIN) 10 MG tablet Take 1 tablet (10 mg) by mouth daily for 30 days 30 tablet 0    methylphenidate (RITALIN) 10 MG tablet as needed  0    propranolol (INDERAL) 40 MG tablet Take 1 tablet (40 mg) by mouth at bedtime 90 tablet 3     No current facility-administered medications for this visit.       Social History     Tobacco Use    Smoking status: Never    Smokeless tobacco: Never   Vaping Use    Vaping status: Never Used   Substance Use Topics    Alcohol use: No    Drug use: No       Honoring Choices - Health Care Directive Guide offered to patient at time of visit.    Health Maintenance Due " "  Topic Date Due    YEARLY PREVENTIVE VISIT  Never done    ADVANCE CARE PLANNING  Never done    Pneumococcal Vaccine: Pediatrics (0 to 5 Years) and At-Risk Patients (6 to 64 Years) (1 of 2 - PCV) 02/10/2008    HIV SCREENING  Never done    HPV IMMUNIZATION (1 - 3-dose series) Never done    HEPATITIS C SCREENING  Never done    PAP  Never done    CHLAMYDIA SCREENING  07/21/2024    INFLUENZA VACCINE (1) 09/01/2024    COVID-19 Vaccine (2 - 2024-25 season) 09/01/2024       Immunization History   Administered Date(s) Administered    COVID-19 MONOVALENT 12+ (Pfizer) 12/23/2021    Comvax (HIB/HepB) 2002, 2002, 02/20/2003    DTAP (<7y) 2002, 2002, 2002, 08/14/2003, 02/27/2007    Flu, Unspecified 11/07/2006    HEPATITIS A (PEDS 12M-18Y) 11/21/2013, 06/05/2014    Influenza (IIV3) PF 12/01/2003, 10/20/2005, 09/28/2007    Influenza Intranasal Vaccine 10/08/2009, 09/28/2011    Influenza Vaccine >6 months,quad, PF 10/21/2015, 12/15/2020    Influenza, seasonal, injectable, PF 10/21/2010    MMR 02/20/2003, 02/27/2007    Meningococcal ACWY (Menveo ) 06/05/2014    Meningococcal B (Bexsero ) 06/18/2020    Nasal Influenza Vaccine 2-49 (FluMist) 11/21/2013, 12/09/2014    Pneumococcal (PCV 7) 2002, 2002, 2002, 05/22/2003    Polio, Unspecified 2002, 2002, 2002, 02/27/2007    TDAP (Adacel,Boostrix) 11/21/2013, 11/20/2023    Varicella 02/20/2003, 03/16/2011       No results found for: \"PAP\"    Recent Labs   Lab Test 12/22/20  1043 12/21/20  1413 01/15/20  1455   *  --   --    HDL 36*  --   --    TRIG 110*  --   --    ALT 19  --   --    CR  --  0.78 0.80   GFRESTIMATED  --  >90 GFR not calculated, patient <18 years old.   GFRESTBLACK  --  >90 GFR not calculated, patient <18 years old.   POTASSIUM  --  4.1 4.1           9/24/2024     1:22 PM 7/11/2024     4:13 PM   PHQ-2 ( 1999 Pfizer)   Q1: Little interest or pleasure in doing things 1 0   Q2: Feeling down, depressed or " hopeless 1 1   PHQ-2 Score 2 1   Q1: Little interest or pleasure in doing things Several days Not at all   Q2: Feeling down, depressed or hopeless Several days Several days   PHQ-2 Score 2 1            No data to display                     No data to display                     No data to display                Ashley Gutiérrez    September 24, 2024 1:30 PM

## 2024-09-25 LAB — SARS-COV-2 RNA RESP QL NAA+PROBE: NEGATIVE

## 2025-01-14 NOTE — PROCEDURE: COUNSELING
Quality 226: Preventive Care And Screening: Tobacco Use: Screening And Cessation Intervention: Patient screened for tobacco use and is an ex/non-smoker
Detail Level: Detailed
Erythromycin Counseling:  I discussed with the patient the risks of erythromycin including but not limited to GI upset, allergic reaction, drug rash, diarrhea, increase in liver enzymes, and yeast infections.
Quality 130: Documentation Of Current Medications In The Medical Record: Current Medications Documented
Sarecycline Counseling: Patient advised regarding possible photosensitivity and discoloration of the teeth, skin, lips, tongue and gums.  Patient instructed to avoid sunlight, if possible.  When exposed to sunlight, patients should wear protective clothing, sunglasses, and sunscreen.  The patient was instructed to call the office immediately if the following severe adverse effects occur:  hearing changes, easy bruising/bleeding, severe headache, or vision changes.  The patient verbalized understanding of the proper use and possible adverse effects of sarecycline.  All of the patient's questions and concerns were addressed.
Isotretinoin Counseling: Patient should get monthly blood tests, not donate blood, not drive at night if vision affected, not share medication, and not undergo elective surgery for 6 months after tx completed. Side effects reviewed, pt to contact office should one occur.
Detail Level: Detailed
Spironolactone Counseling: Patient advised regarding risks of diarrhea, abdominal pain, hyperkalemia, birth defects (for female patients), liver toxicity and renal toxicity. The patient may need blood work to monitor liver and kidney function and potassium levels while on therapy. The patient verbalized understanding of the proper use and possible adverse effects of spironolactone.  All of the patient's questions and concerns were addressed.
Dapsone Counseling: I discussed with the patient the risks of dapsone including but not limited to hemolytic anemia, agranulocytosis, rashes, methemoglobinemia, kidney failure, peripheral neuropathy, headaches, GI upset, and liver toxicity.  Patients who start dapsone require monitoring including baseline LFTs and weekly CBCs for the first month, then every month thereafter.  The patient verbalized understanding of the proper use and possible adverse effects of dapsone.  All of the patient's questions and concerns were addressed.
Bactrim Counseling:  I discussed with the patient the risks of sulfa antibiotics including but not limited to GI upset, allergic reaction, drug rash, diarrhea, dizziness, photosensitivity, and yeast infections.  Rarely, more serious reactions can occur including but not limited to aplastic anemia, agranulocytosis, methemoglobinemia, blood dyscrasias, liver or kidney failure, lung infiltrates or desquamative/blistering drug rashes.
Birth Control Pills Pregnancy And Lactation Text: This medication should be avoided if pregnant and for the first 30 days post-partum.
High Dose Vitamin A Counseling: Side effects reviewed, pt to contact office should one occur.
Topical Clindamycin Pregnancy And Lactation Text: This medication is Pregnancy Category B and is considered safe during pregnancy. It is unknown if it is excreted in breast milk.
Birth Control Pills Counseling: Birth Control Pill Counseling: I discussed with the patient the potential side effects of OCPs including but not limited to increased risk of stroke, heart attack, thrombophlebitis, deep venous thrombosis, hepatic adenomas, breast changes, GI upset, headaches, and depression.  The patient verbalized understanding of the proper use and possible adverse effects of OCPs. All of the patient's questions and concerns were addressed.
Spironolactone Pregnancy And Lactation Text: This medication can cause feminization of the male fetus and should be avoided during pregnancy. The active metabolite is also found in breast milk.
Use Enhanced Medication Counseling?: No
Erythromycin Pregnancy And Lactation Text: This medication is Pregnancy Category B and is considered safe during pregnancy. It is also excreted in breast milk.
Isotretinoin Pregnancy And Lactation Text: This medication is Pregnancy Category X and is considered extremely dangerous during pregnancy. It is unknown if it is excreted in breast milk.
Azithromycin Pregnancy And Lactation Text: This medication is considered safe during pregnancy and is also secreted in breast milk.
Azithromycin Counseling:  I discussed with the patient the risks of azithromycin including but not limited to GI upset, allergic reaction, drug rash, diarrhea, and yeast infections.
Doxycycline Counseling:  Patient counseled regarding possible photosensitivity and increased risk for sunburn.  Patient instructed to avoid sunlight, if possible.  When exposed to sunlight, patients should wear protective clothing, sunglasses, and sunscreen.  The patient was instructed to call the office immediately if the following severe adverse effects occur:  hearing changes, easy bruising/bleeding, severe headache, or vision changes.  The patient verbalized understanding of the proper use and possible adverse effects of doxycycline.  All of the patient's questions and concerns were addressed.
Topical Clindamycin Counseling: Patient counseled that this medication may cause skin irritation or allergic reactions.  In the event of skin irritation, the patient was advised to reduce the amount of the drug applied or use it less frequently.   The patient verbalized understanding of the proper use and possible adverse effects of clindamycin.  All of the patient's questions and concerns were addressed.
Benzoyl Peroxide Counseling: Patient counseled that medicine may cause skin irritation and bleach clothing.  In the event of skin irritation, the patient was advised to reduce the amount of the drug applied or use it less frequently.   The patient verbalized understanding of the proper use and possible adverse effects of benzoyl peroxide.  All of the patient's questions and concerns were addressed.
Tetracycline Counseling: Patient counseled regarding possible photosensitivity and increased risk for sunburn.  Patient instructed to avoid sunlight, if possible.  When exposed to sunlight, patients should wear protective clothing, sunglasses, and sunscreen.  The patient was instructed to call the office immediately if the following severe adverse effects occur:  hearing changes, easy bruising/bleeding, severe headache, or vision changes.  The patient verbalized understanding of the proper use and possible adverse effects of tetracycline.  All of the patient's questions and concerns were addressed. Patient understands to avoid pregnancy while on therapy due to potential birth defects.
Sarecycline Pregnancy And Lactation Text: This medication is Pregnancy Category D and not consider safe during pregnancy. It is also excreted in breast milk.
Doxycycline Pregnancy And Lactation Text: This medication is Pregnancy Category D and not consider safe during pregnancy. It is also excreted in breast milk but is considered safe for shorter treatment courses.
Topical Retinoid Pregnancy And Lactation Text: This medication is Pregnancy Category C. It is unknown if this medication is excreted in breast milk.
Topical Retinoid counseling:  Patient advised to apply a pea-sized amount only at bedtime and wait 30 minutes after washing their face before applying.  If too drying, patient may add a non-comedogenic moisturizer. The patient verbalized understanding of the proper use and possible adverse effects of retinoids.  All of the patient's questions and concerns were addressed.
Minocycline Counseling: Patient advised regarding possible photosensitivity and discoloration of the teeth, skin, lips, tongue and gums.  Patient instructed to avoid sunlight, if possible.  When exposed to sunlight, patients should wear protective clothing, sunglasses, and sunscreen.  The patient was instructed to call the office immediately if the following severe adverse effects occur:  hearing changes, easy bruising/bleeding, severe headache, or vision changes.  The patient verbalized understanding of the proper use and possible adverse effects of minocycline.  All of the patient's questions and concerns were addressed.
Dapsone Pregnancy And Lactation Text: This medication is Pregnancy Category C and is not considered safe during pregnancy or breast feeding.
Tazorac Pregnancy And Lactation Text: This medication is not safe during pregnancy. It is unknown if this medication is excreted in breast milk.
Benzoyl Peroxide Pregnancy And Lactation Text: This medication is Pregnancy Category C. It is unknown if benzoyl peroxide is excreted in breast milk.
Tazorac Counseling:  Patient advised that medication is irritating and drying.  Patient may need to apply sparingly and wash off after an hour before eventually leaving it on overnight.  The patient verbalized understanding of the proper use and possible adverse effects of tazorac.  All of the patient's questions and concerns were addressed.
Topical Sulfur Applications Pregnancy And Lactation Text: This medication is Pregnancy Category C and has an unknown safety profile during pregnancy. It is unknown if this topical medication is excreted in breast milk.
Topical Sulfur Applications Counseling: Topical Sulfur Counseling: Patient counseled that this medication may cause skin irritation or allergic reactions.  In the event of skin irritation, the patient was advised to reduce the amount of the drug applied or use it less frequently.   The patient verbalized understanding of the proper use and possible adverse effects of topical sulfur application.  All of the patient's questions and concerns were addressed.
Bactrim Pregnancy And Lactation Text: This medication is Pregnancy Category D and is known to cause fetal risk.  It is also excreted in breast milk.
High Dose Vitamin A Pregnancy And Lactation Text: High dose vitamin A therapy is contraindicated during pregnancy and breast feeding.

## 2025-07-12 ENCOUNTER — HEALTH MAINTENANCE LETTER (OUTPATIENT)
Age: 25
End: 2025-07-12

## 2025-07-14 ENCOUNTER — TELEPHONE (OUTPATIENT)
Dept: DERMATOLOGY | Facility: CLINIC | Age: 25
End: 2025-07-14
Payer: COMMERCIAL

## 2025-07-14 NOTE — TELEPHONE ENCOUNTER
Online Appointment Request      Health Call Center  Phone Message      Reason for Call: Appointment Intake     Patients Requests:    Reason for appointment  I have sores/scabs spreading on the back of my leg/lower butt. They itch and hurt   Preferred Provider  JOE VANEGAS  Preferred Visit Type  Either          Action taken: Other: none; Documenting patient was called; detail message was left for patient to call back.

## 2025-08-13 ENCOUNTER — TELEPHONE (OUTPATIENT)
Dept: CARDIOLOGY | Facility: CLINIC | Age: 25
End: 2025-08-13

## 2025-08-13 DIAGNOSIS — I47.10 SVT (SUPRAVENTRICULAR TACHYCARDIA): ICD-10-CM

## 2025-08-13 DIAGNOSIS — R00.2 PALPITATIONS: ICD-10-CM

## 2025-08-13 RX ORDER — PROPRANOLOL HYDROCHLORIDE 40 MG/1
40 TABLET ORAL AT BEDTIME
Qty: 90 TABLET | Refills: 0 | Status: SHIPPED | OUTPATIENT
Start: 2025-08-13